# Patient Record
Sex: MALE | Race: WHITE | HISPANIC OR LATINO | URBAN - NONMETROPOLITAN AREA
[De-identification: names, ages, dates, MRNs, and addresses within clinical notes are randomized per-mention and may not be internally consistent; named-entity substitution may affect disease eponyms.]

---

## 2022-09-02 ENCOUNTER — OFFICE VISIT (OUTPATIENT)
Dept: URBAN - NONMETROPOLITAN AREA CLINIC 18 | Facility: CLINIC | Age: 27
End: 2022-09-02
Payer: COMMERCIAL

## 2022-09-02 DIAGNOSIS — H18.602 KERATOCONUS, UNSPECIFIED, LEFT EYE: ICD-10-CM

## 2022-09-02 DIAGNOSIS — H52.221 REGULAR ASTIGMATISM, RIGHT EYE: Primary | ICD-10-CM

## 2022-09-02 DIAGNOSIS — H52.212 IRREGULAR ASTIGMATISM, LEFT EYE: ICD-10-CM

## 2022-09-02 PROCEDURE — 92004 COMPRE OPH EXAM NEW PT 1/>: CPT | Performed by: OPTOMETRIST

## 2022-09-02 ASSESSMENT — VISUAL ACUITY
OD: 20/20
OS: 20/30

## 2022-09-02 ASSESSMENT — INTRAOCULAR PRESSURE
OS: 9
OD: 12

## 2022-09-02 NOTE — IMPRESSION/PLAN
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 Plan: Discussed status in detail. Patient expresses an understanding to diagnosis. No rubbing of eyes discussed. Recommend corneal crosslinking consultation with Dr. Bob Godinez. In addition, based on pupil concerns. Repeat evaluation in 2-3 weeks with no dilation and pupil check and topography in [place].

## 2022-09-02 NOTE — IMPRESSION/PLAN
Impression: Regular astigmatism, right eye: H52.221. Plan: Reviewed refractive prescription in detail with patient and ways glasses can be used  to improve vision. Release spectacle prescription at this time. Discussed trivex and polycarbonate options for OD protection.

## 2022-09-23 ENCOUNTER — OFFICE VISIT (OUTPATIENT)
Dept: URBAN - NONMETROPOLITAN AREA CLINIC 18 | Facility: CLINIC | Age: 27
End: 2022-09-23
Payer: MEDICAID

## 2022-09-23 DIAGNOSIS — H18.602 KERATOCONUS, UNSPECIFIED, LEFT EYE: Primary | ICD-10-CM

## 2022-09-23 DIAGNOSIS — H57.02 ANISOCORIA: ICD-10-CM

## 2022-09-23 PROCEDURE — 99213 OFFICE O/P EST LOW 20 MIN: CPT | Performed by: OPTOMETRIST

## 2022-09-23 ASSESSMENT — INTRAOCULAR PRESSURE
OS: 6
OD: 8

## 2022-09-23 NOTE — IMPRESSION/PLAN
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 Plan: Discussed status in detail. Patient expresses an understanding to diagnosis. No rubbing of eyes discussed. Recommend corneal crosslinking consultation with Dr. Georgina Sosa. Baseline topography today.

## 2022-09-23 NOTE — IMPRESSION/PLAN
Impression: Anisocoria: H57.02. Plan: Discussed status. 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 RTC if any new headaches or problems experienced.